# Patient Record
Sex: MALE | Race: WHITE | NOT HISPANIC OR LATINO | Employment: FULL TIME | URBAN - METROPOLITAN AREA
[De-identification: names, ages, dates, MRNs, and addresses within clinical notes are randomized per-mention and may not be internally consistent; named-entity substitution may affect disease eponyms.]

---

## 2017-06-20 ENCOUNTER — OFFICE VISIT (OUTPATIENT)
Dept: URGENT CARE | Facility: PHYSICIAN GROUP | Age: 62
End: 2017-06-20
Payer: COMMERCIAL

## 2017-06-20 VITALS
HEART RATE: 88 BPM | OXYGEN SATURATION: 94 % | DIASTOLIC BLOOD PRESSURE: 64 MMHG | SYSTOLIC BLOOD PRESSURE: 112 MMHG | BODY MASS INDEX: 25.63 KG/M2 | WEIGHT: 189 LBS | RESPIRATION RATE: 16 BRPM | TEMPERATURE: 99.3 F

## 2017-06-20 DIAGNOSIS — J06.9 ACUTE URI: ICD-10-CM

## 2017-06-20 PROCEDURE — 99214 OFFICE O/P EST MOD 30 MIN: CPT | Performed by: PHYSICIAN ASSISTANT

## 2017-06-23 ASSESSMENT — ENCOUNTER SYMPTOMS
SWEATS: 0
FEVER: 0
HEMOPTYSIS: 0
COUGH: 1
MYALGIAS: 0
RHINORRHEA: 1
SORE THROAT: 1
CHILLS: 0
SHORTNESS OF BREATH: 0
HEADACHES: 0
WHEEZING: 0

## 2017-06-23 NOTE — PROGRESS NOTES
Subjective:      Rk Carmichael Jr. is a 61 y.o. male who presents with Cough    PMH:  has a past medical history of HTN (hypertension); GERD (gastroesophageal reflux disease); Gilbert's disease; Diverticulosis of colon; Paroxysmal atrial fibrillation (CMS-HCC) (9/04); Kidney stone (3/2010); Hepatitis B; Hepatitis C; and Diverticulosis (2014).  MEDS:   Current outpatient prescriptions:   •  atorvastatin (LIPITOR) 10 MG TABS, Take 1 Tab by mouth every day., Disp: 90 Tab, Rfl: 1  •  atenolol (TENORMIN) 100 MG TABS, Take 1 Tab by mouth every day., Disp: 90 Tab, Rfl: 1  •  omeprazole (PRILOSEC) 40 MG capsule, Take 1 Cap by mouth every day., Disp: 90 Cap, Rfl: 3  •  Multiple Vitamins-Minerals (MULTIVITAMIN PO), Take  by mouth.  , Disp: , Rfl:   •  aspirin (ASA) 81 MG CHEW, Take 81 mg by mouth every day., Disp: , Rfl:   •  Cyanocobalamin (VITAMIN B12) 100 MCG TABS, Take  by mouth every day., Disp: , Rfl:   •  ibuprofen (MOTRIN) 200 MG TABS, Take 200 mg by mouth every 6 hours as needed., Disp: , Rfl:   ALLERGIES:   Allergies   Allergen Reactions   • Lisinopril      Cough       SURGHX:   Past Surgical History   Procedure Laterality Date   • Tonsillectomy  as child   • Cystoscopy  1961     bladder dilation   • Shoulder arthroscopy w/ rotator cuff repair  1/5/10     excision bone spur, biceps tendon repair   • Knee arthroscopy  5/4/2011     Performed by XOCHITL MEDINA at [facility] ORS   • Medial meniscectomy  5/4/2011     Performed by XOCHITL MEDINA at [facility] ORS   • Meniscectomy  5/4/2011     Performed by XOCHITL MEDINA at [facility] ORS   • Synovectomy  5/4/2011     Performed by XOCHITL MEDINA at [facility] ORS   • Colonoscopy  01/23/14     SOCHX:  reports that he quit smoking about 18 years ago. His smoking use included Cigarettes. He has a 20 pack-year smoking history. He has never used smokeless tobacco. He reports that he drinks alcohol. He reports that he does not use illicit  drugs.  FH: family history includes Cancer in his father, maternal uncle, mother, paternal grandfather, and paternal uncle; Diabetes in his father; Genetic in his brother and mother; Heart Disease in his father; Hypertension in his father and mother.          HPI Comments: Patient presents with:  Cough: congestion, sore throat x 3 days, clear rhinorrhea, no fever.  Has not tried any otc medications or treatment for symptoms.          Cough  This is a new problem. The current episode started in the past 7 days. The problem has been unchanged. The problem occurs every few minutes. The cough is non-productive. Associated symptoms include ear congestion, nasal congestion, postnasal drip, rhinorrhea and a sore throat. Pertinent negatives include no chest pain, chills, ear pain, fever, headaches, hemoptysis, myalgias, rash, shortness of breath, sweats or wheezing. The symptoms are aggravated by exercise, dust, lying down and pollens. Risk factors for lung disease include smoking/tobacco exposure. He has tried nothing for the symptoms. The treatment provided no relief. His past medical history is significant for environmental allergies. There is no history of asthma.       Review of Systems   Constitutional: Negative for fever, chills and malaise/fatigue.   HENT: Positive for congestion, postnasal drip, rhinorrhea and sore throat. Negative for ear pain.    Respiratory: Positive for cough. Negative for hemoptysis, shortness of breath and wheezing.    Cardiovascular: Negative for chest pain.   Musculoskeletal: Negative for myalgias.   Skin: Negative for rash.   Neurological: Negative for headaches.   Endo/Heme/Allergies: Positive for environmental allergies.   All other systems reviewed and are negative.         Objective:     /64 mmHg  Pulse 88  Temp(Src) 37.4 °C (99.3 °F)  Resp 16  Wt 85.73 kg (189 lb)  SpO2 94%     Physical Exam   Constitutional: He appears well-developed and well-nourished. No distress.    HENT:   Head: Normocephalic.   Right Ear: Tympanic membrane and external ear normal.   Left Ear: Tympanic membrane and external ear normal.   Nose: Mucosal edema and rhinorrhea present.   Mouth/Throat: Uvula is midline. Posterior oropharyngeal erythema present. No posterior oropharyngeal edema.   Eyes: Pupils are equal, round, and reactive to light.   Neck: Normal range of motion. Neck supple.   Cardiovascular: Normal rate, regular rhythm and normal heart sounds.    Pulmonary/Chest: Effort normal. He has no decreased breath sounds. He has rhonchi.   Abdominal: Soft.   Musculoskeletal: Normal range of motion.   Lymphadenopathy:     He has no cervical adenopathy.   Neurological: He is alert.   Skin: Skin is warm and dry.   Psychiatric: He has a normal mood and affect.   Nursing note and vitals reviewed.       Assessment/Plan:     1. Acute URI       Discussed that I felt this was viral in nature. Did not see any evidence of a bacterial process. Discussed natural progression and sx care.    PT was instructed to begin a daily OTC allergy medication for relief of any possible allergy symptoms.  Ex: allegra, claritin, zyrtec, allerclear, etc.   Pt can also take OTC benadryl at bedtime if symptoms are keeping them awake at night.       PT should follow up with PCP in 1-2 days for re-evaluation if symptoms have not improved.  Discussed red flags and reasons to return to UC or ED.  Pt and/or family verbalized understanding of diagnosis and follow up instructions and was given informational handout on diagnosis.  PT discharged.

## 2018-11-23 ENCOUNTER — OFFICE VISIT (OUTPATIENT)
Dept: URGENT CARE | Facility: PHYSICIAN GROUP | Age: 63
End: 2018-11-23
Payer: COMMERCIAL

## 2018-11-23 VITALS
HEART RATE: 64 BPM | OXYGEN SATURATION: 95 % | RESPIRATION RATE: 14 BRPM | SYSTOLIC BLOOD PRESSURE: 142 MMHG | WEIGHT: 195 LBS | TEMPERATURE: 97.9 F | BODY MASS INDEX: 27.3 KG/M2 | DIASTOLIC BLOOD PRESSURE: 80 MMHG | HEIGHT: 71 IN

## 2018-11-23 DIAGNOSIS — Z87.19 HISTORY OF DIVERTICULITIS: ICD-10-CM

## 2018-11-23 DIAGNOSIS — R10.32 LLQ ABDOMINAL PAIN: Primary | ICD-10-CM

## 2018-11-23 PROCEDURE — 99214 OFFICE O/P EST MOD 30 MIN: CPT | Performed by: PHYSICIAN ASSISTANT

## 2018-11-23 RX ORDER — CIPROFLOXACIN 500 MG/1
500 TABLET, FILM COATED ORAL 2 TIMES DAILY
Qty: 20 TAB | Refills: 0 | Status: SHIPPED | OUTPATIENT
Start: 2018-11-23 | End: 2018-12-03

## 2018-11-23 RX ORDER — METRONIDAZOLE 500 MG/1
500 TABLET ORAL 3 TIMES DAILY
Qty: 30 TAB | Refills: 0 | Status: SHIPPED | OUTPATIENT
Start: 2018-11-23 | End: 2018-12-03

## 2018-11-24 NOTE — PROGRESS NOTES
Subjective:      Rk Carmichael Jr. is a 63 y.o. male who presents with Diverticulosis (poss diverticulosis flare up)    PMH:  has a past medical history of Diverticulosis (2014); Diverticulosis of colon; GERD (gastroesophageal reflux disease); Gilbert's disease; Hepatitis B; Hepatitis C; HTN (hypertension); Kidney stone (3/2010); and Paroxysmal atrial fibrillation (HCC) (9/04).  MEDS:   Current Outpatient Prescriptions:   •  ibuprofen (MOTRIN) 200 MG TABS, Take 200 mg by mouth every 6 hours as needed., Disp: , Rfl:   •  atorvastatin (LIPITOR) 10 MG TABS, Take 1 Tab by mouth every day., Disp: 90 Tab, Rfl: 1  •  atenolol (TENORMIN) 100 MG TABS, Take 1 Tab by mouth every day., Disp: 90 Tab, Rfl: 1  •  omeprazole (PRILOSEC) 40 MG capsule, Take 1 Cap by mouth every day., Disp: 90 Cap, Rfl: 3  •  Multiple Vitamins-Minerals (MULTIVITAMIN PO), Take  by mouth.  , Disp: , Rfl:   •  aspirin (ASA) 81 MG CHEW, Take 81 mg by mouth every day., Disp: , Rfl:   •  Cyanocobalamin (VITAMIN B12) 100 MCG TABS, Take  by mouth every day., Disp: , Rfl:   ALLERGIES:   Allergies   Allergen Reactions   • Lisinopril      Cough       SURGHX:   Past Surgical History:   Procedure Laterality Date   • COLONOSCOPY  01/23/14   • KNEE ARTHROSCOPY  5/4/2011    Performed by XOCHITL MEDINA at [facility] ORS   • MEDIAL MENISCECTOMY  5/4/2011    Performed by XOCHITL MEDINA at [facility] ORS   • MENISCECTOMY  5/4/2011    Performed by XOCHITL MEDINA at [facility] ORS   • SYNOVECTOMY  5/4/2011    Performed by XOCHITL MEDINA at [facility] ORS   • SHOULDER ARTHROSCOPY W/ ROTATOR CUFF REPAIR  1/5/10    excision bone spur, biceps tendon repair   • CYSTOSCOPY  1961    bladder dilation   • TONSILLECTOMY  as child     SOCHX:  reports that he quit smoking about 19 years ago. His smoking use included Cigarettes. He has a 20.00 pack-year smoking history. He has never used smokeless tobacco. He reports that he drinks alcohol. He  "reports that he does not use drugs.  FH: Reviewed with patient/family. Not pertinent to this complaint.           Patient presents with:  Diverticulosis: poss diverticulosis flare up. Pt has history of diverticulitis, states this feels the same as last time.  Pt has had increasing discomfort over the course of 10-12 days.  Started as a twinge here and there, progressing to a more consistent pain.  Pt denies fever, n/v/d/c states some relief with BM but no resolution.            LLQ Pain    This is a new problem. The current episode started 1 to 4 weeks ago. The onset quality is gradual. The problem occurs constantly. The problem has been waxing and waning. The pain is located in the LLQ and suprapubic region. The pain is at a severity of 5/10. The quality of the pain is colicky, aching and cramping. The abdominal pain does not radiate. Pertinent negatives include no anorexia, constipation, diarrhea, fever, hematochezia, melena, nausea or vomiting. The pain is aggravated by certain positions, eating and palpation. The pain is relieved by certain positions and bowel movements. Treatments tried: change in diet, increase in fiber with fiber pills, probiotics.  The treatment provided no relief. Prior diagnostic workup includes CT scan. There is no history of colon cancer, Crohn's disease, irritable bowel syndrome or ulcerative colitis.  diverticulitis       Review of Systems   Constitutional: Negative for fever.   Gastrointestinal: Positive for abdominal pain. Negative for anorexia, blood in stool (bleeding hemorroid - on/off), constipation, diarrhea, heartburn, hematochezia, melena, nausea and vomiting.   All other systems reviewed and are negative.         Objective:     /80 (BP Location: Left arm, Patient Position: Sitting, BP Cuff Size: Small adult)   Pulse 64   Temp 36.6 °C (97.9 °F) (Temporal)   Resp 14   Ht 1.803 m (5' 11\")   Wt 88.5 kg (195 lb)   SpO2 95%   BMI 27.20 kg/m²       Physical Exam "   Constitutional: He is oriented to person, place, and time. He appears well-developed and well-nourished. No distress.   HENT:   Head: Normocephalic and atraumatic.   Nose: Nose normal.   Mouth/Throat: Oropharynx is clear and moist.   Eyes: Pupils are equal, round, and reactive to light. Conjunctivae and EOM are normal.   Neck: Normal range of motion.   Cardiovascular: Regular rhythm and normal heart sounds.    Pulmonary/Chest: Effort normal and breath sounds normal.   Abdominal: Soft. Bowel sounds are normal. There is tenderness in the left lower quadrant. There is no rigidity, no rebound, no guarding and no CVA tenderness.       Musculoskeletal: Normal range of motion.   Neurological: He is alert and oriented to person, place, and time. Gait normal.   Skin: Skin is warm and dry. Capillary refill takes less than 2 seconds.   Psychiatric: He has a normal mood and affect.   Nursing note and vitals reviewed.              Assessment/Plan:     1. LLQ abdominal pain  ciprofloxacin (CIPRO) 500 MG Tab    metroNIDAZOLE (FLAGYL) 500 MG Tab   2. History of diverticulitis  ciprofloxacin (CIPRO) 500 MG Tab    metroNIDAZOLE (FLAGYL) 500 MG Tab     PT does not feel he needs imaging at this time, would like to attempt treating with oral antibiotics first.  Patient will go to a liquid diet for a few days to give himself some bowel rest.  Patient's wife is a nurse, they both feel confident that they will recognize the signs of any worsening condition.    PT should follow up with PCP in 1-2 days for re-evaluation if symptoms have not improved.  Discussed red flags and reasons to return to UC or ED.  Pt and/or family verbalized understanding of diagnosis and follow up instructions and was offered informational handout on diagnosis.  PT discharged.

## 2018-11-25 ASSESSMENT — ENCOUNTER SYMPTOMS
HEMATOCHEZIA: 0
ANOREXIA: 0
VOMITING: 0
ABDOMINAL PAIN: 1
NAUSEA: 0
FEVER: 0
BLOOD IN STOOL: 0
DIARRHEA: 0
CONSTIPATION: 0
HEARTBURN: 0

## 2018-11-25 ASSESSMENT — CROHNS DISEASE ACTIVITY INDEX (CDAI): CDAI SCORE: 0

## 2019-02-25 ENCOUNTER — HOSPITAL ENCOUNTER (OUTPATIENT)
Dept: RADIOLOGY | Facility: MEDICAL CENTER | Age: 64
End: 2019-02-25
Attending: PHYSICIAN ASSISTANT
Payer: COMMERCIAL

## 2019-02-25 DIAGNOSIS — R10.32 LLQ PAIN: ICD-10-CM

## 2019-02-25 PROCEDURE — 74177 CT ABD & PELVIS W/CONTRAST: CPT

## 2019-02-25 PROCEDURE — 700117 HCHG RX CONTRAST REV CODE 255: Performed by: PHYSICIAN ASSISTANT

## 2019-02-25 RX ADMIN — IOHEXOL 50 ML: 240 INJECTION, SOLUTION INTRATHECAL; INTRAVASCULAR; INTRAVENOUS; ORAL at 14:30

## 2019-02-25 RX ADMIN — IOHEXOL 100 ML: 350 INJECTION, SOLUTION INTRAVENOUS at 14:30

## 2019-08-15 ENCOUNTER — OFFICE VISIT (OUTPATIENT)
Dept: URGENT CARE | Facility: PHYSICIAN GROUP | Age: 64
End: 2019-08-15
Payer: COMMERCIAL

## 2019-08-15 VITALS
OXYGEN SATURATION: 98 % | WEIGHT: 201 LBS | BODY MASS INDEX: 28.14 KG/M2 | HEART RATE: 56 BPM | SYSTOLIC BLOOD PRESSURE: 118 MMHG | TEMPERATURE: 97.9 F | HEIGHT: 71 IN | RESPIRATION RATE: 12 BRPM | DIASTOLIC BLOOD PRESSURE: 76 MMHG

## 2019-08-15 DIAGNOSIS — M79.672 ACUTE PAIN OF LEFT FOOT: ICD-10-CM

## 2019-08-15 PROCEDURE — 99213 OFFICE O/P EST LOW 20 MIN: CPT | Performed by: FAMILY MEDICINE

## 2019-08-15 RX ORDER — LOSARTAN POTASSIUM 100 MG/1
100 TABLET ORAL DAILY
COMMUNITY
Start: 2019-07-28

## 2019-08-15 ASSESSMENT — ENCOUNTER SYMPTOMS
NUMBNESS: 1
FEVER: 0
JOINT SWELLING: 0
WEAKNESS: 0

## 2019-08-15 NOTE — PROGRESS NOTES
"Subjective:   Rk Carmichael Jr. is a 64 y.o. male who presents for Foot Pain (red, swollen, painful (L) x5days)     Foot Problem   This is a new problem. The current episode started in the past 7 days. The problem occurs constantly. The problem has been gradually worsening. Associated symptoms include numbness. Pertinent negatives include no fever, joint swelling or weakness.     Review of Systems   Constitutional: Negative for fever.   Musculoskeletal: Negative for joint swelling.   Neurological: Positive for numbness. Negative for weakness.      Objective:   /76 (BP Location: Right arm, Patient Position: Sitting, BP Cuff Size: Adult)   Pulse (!) 56   Temp 36.6 °C (97.9 °F) (Temporal)   Resp 12   Ht 1.803 m (5' 11\")   Wt 91.2 kg (201 lb)   SpO2 98%   BMI 28.03 kg/m²   Physical Exam   Constitutional: He is oriented to person, place, and time. He appears well-developed and well-nourished. No distress.   HENT:   Head: Normocephalic and atraumatic.   Eyes: Pupils are equal, round, and reactive to light. Conjunctivae and EOM are normal.   Cardiovascular: Normal rate and regular rhythm.   No murmur heard.  Pulmonary/Chest: Effort normal and breath sounds normal. No respiratory distress.   Abdominal: Soft. He exhibits no distension. There is no tenderness.   Musculoskeletal:        Left foot: There is tenderness and swelling. There is normal range of motion, no bony tenderness and no deformity.   Neurological: He is alert and oriented to person, place, and time. He has normal reflexes. No sensory deficit.   Mild decrease in sensation to dorsal left foot.   Skin: Skin is warm and dry.   Psychiatric: He has a normal mood and affect.        Assessment/Plan:   1. Acute pain of left foot    Other orders  - losartan (COZAAR) 100 MG Tab; Take 100 mg by mouth every day.    Differential diagnosis, natural history, supportive care, and indications for immediate follow-up discussed.    Use over-the-counter pain " reliever, such as acetaminophen (Tylenol), ibuprofen (Advil, Motrin) or naproxen (Aleve) as needed; follow package directions for dosing.

## 2020-03-18 ENCOUNTER — TELEPHONE (OUTPATIENT)
Dept: NEUROLOGY | Facility: MEDICAL CENTER | Age: 65
End: 2020-03-18

## 2020-03-18 NOTE — TELEPHONE ENCOUNTER
1. Caller Name: Rk                      Call Back Number:   [facility] PCP or Specialty Provider: Yes Patricia Ward        2.  Does patient have any active symptoms of respiratory illness (fever OR cough OR shortness of breath)? Yes, the patient reports the following respiratory symptoms: cough and mild sore throat, SOB.    3.  Does patient have any comoribidities? None     4.  In the last 30 days, has the patient traveled outside of the country OR in a high risk area within the [place] OR have any known contact with someone who has or is suspected to have COVID-19?  No.    5. Disposition: Advised to perform self care, monitor for worsening symptoms and to call back in 3 days if no improvement    Note routed to PCP: JJ only.

## 2022-08-04 ENCOUNTER — HOSPITAL ENCOUNTER (OUTPATIENT)
Dept: RADIOLOGY | Facility: MEDICAL CENTER | Age: 67
End: 2022-08-04
Attending: FAMILY MEDICINE
Payer: MEDICARE

## 2022-08-04 DIAGNOSIS — S92.354D CLOSED NONDISPLACED FRACTURE OF FIFTH METATARSAL BONE OF RIGHT FOOT WITH ROUTINE HEALING: ICD-10-CM

## 2022-08-04 PROCEDURE — 77080 DXA BONE DENSITY AXIAL: CPT

## 2024-09-11 ENCOUNTER — HOSPITAL ENCOUNTER (OUTPATIENT)
Dept: RADIOLOGY | Facility: MEDICAL CENTER | Age: 69
End: 2024-09-11
Payer: MEDICARE

## 2024-09-11 DIAGNOSIS — R13.10 DYSPHAGIA, UNSPECIFIED TYPE: ICD-10-CM

## 2024-09-11 DIAGNOSIS — K63.5 POLYP OF COLON, UNSPECIFIED PART OF COLON, UNSPECIFIED TYPE: ICD-10-CM

## 2024-09-11 DIAGNOSIS — Z80.0 FAMILY HISTORY OF COLON CANCER: ICD-10-CM

## 2024-09-11 DIAGNOSIS — R13.13 PHARYNGEAL DYSPHAGIA: ICD-10-CM

## 2024-09-11 PROCEDURE — 74230 X-RAY XM SWLNG FUNCJ C+: CPT

## 2024-09-11 PROCEDURE — 700117 HCHG RX CONTRAST REV CODE 255: Performed by: FAMILY MEDICINE

## 2024-09-11 PROCEDURE — 92611 MOTION FLUOROSCOPY/SWALLOW: CPT

## 2024-09-11 RX ADMIN — BARIUM SULFATE 700 MG: 700 TABLET ORAL at 15:30

## 2024-09-12 NOTE — OP THERAPY EVALUATION
[facility] Therapy Services  Outpatient Modified Barium Swallow Study       Patient Name: Rk Carmichael Jr.  Date of Evaluation: 9/11/2024  Referring Provider: Jeremias De La Cruz P.A.-C.  Fax: [phone]    Patient History/Reason for Referral  The pt was referred for a Modified Barium Swallow Study (MBSS) by GI due to dysphagia with choking fits.    PMHx: GERD on PPI, Afib, HTN, Hepatitis C, Gilbert's disease, diverticulosis of the colon, dyslipidemia.    Oral Mechanism Evaluation  Facial Symmetry: Equal  Facial Sensation: Equal  Labial Observations: WFL  Lingual Observations: Midline  Dentition: Good  Comments:    Voice  Quality: WFL  Resonance: WFL  Intensity: Appropriate  Cough: WFL  Comments:    Current Method of Nutrition   Oral diet (Regular solids, Thin liquids)    Pertinent Information  Affect/Behavior: Calm, Cooperative  Oxygen Requirements: Room Air  Secretion Management: WNL    Subjective  Patient reports feeling saliva and/or liquid going down the wrong way in his throat ~2-3x per week. He reports the frequency as increased over the last 6 months. He also reports pills getting stuck in his chest area. He reports a hx of esophageal dilation ~12-15 years ago.    Discussed with the risks, benefits, and alternatives of the MBSS procedure. Patient acknowledged and agreed to proceed.    Assessment  Videofluoroscopic Swallow Study was conducted in the lateral and AP projection(s) to evaluate oropharyngeal swallow function. A radiology tech was present to assist with the procedure.     Positioning: seated upright in fluoroscopy chair, standing (AP view)  Anatomic View: WNL  Bolus Administration: SLP and Patient  PO barium contrast trials: Varibar thin liquid, Varibar nectar (mildly thick) liquid, Varibar pudding, solid coated in Varibar pudding, barium tablet w/ water    Consistency PAS Score Timing Residue Comments   Thin Liquid 3 During swallow Vallecular Residue: Mild (5%-25%)  Pyriform Sinus Residue:  Mild (5%-25%) Tsp, cup, straw - PAS 1  Tsp - PAS 2 during  Serial cup - PAS 3 during   Mildly Thick Liquid 1 N/A Vallecular Residue: Mild (5%-25%)  Pyriform Sinus Residue: Trace (1%-5%) Tsp, cup   Pudding 1 N/A Vallecular Residue: Trace (1%-5%)  Pyriform Sinus Residue: Trace (1%-5%)    Soft & Bite Sized 1 N/A Vallecular Residue: Moderate (25%-50%)  Pyriform Sinus Residue: Trace (1%-5%)    Regular 1 N/A Vallecular Residue: Mild (5%-25%)  Pyriform Sinus Residue: None (0%)    Barium Tablet N/A N/A Vallecular Residue: None (0%)  Pyriform Sinus Residue: None (0%)        Penetration-Aspiration Scale (PAS)  1     No contrast enters airway  2     Contrast enters the airway, remains above the vocal folds, and is ejected from the airway (not seen in the airway at the end of the swallow).  3     Contrast enters the airway, remains above the vocal folds, and is not ejected from the airway (is seen in the airway after the swallow).  4     Contrast enters the airway, contacts the vocal folds, and is ejected from the airway.  5     Contrast enters the airway, contacts the vocal folds, and is not ejected from the airway  6     Contrast enters the airway, crosses the plane of the vocal folds, and is ejected from the airway.  7     Contrast enters the airway, crosses the plane of the vocal folds, and is not ejected from the airway despite effort.  8     Contrast enters the airway, crosses the plane of the vocal folds, is not ejected from the airway and there is no response to aspiration.    The following qualitative information was analyzed via the MBSImp Protocol (Crystal et al., 2008):     Oral phase:  Lip closure was characterized by complete labial seal.   Tongue control during bolus hold was characterized by cohesive bolus between tongue to palatal seal  Bolus preparation/mastication was partial/reduced with soft solids though timely and efficient with dry solids.   Bolus transport/lingual motion was brisk.   Oral  clearance was incomplete with residue collection on oral structures due to piecemeal deglutition (variation of normal).    Pharyngeal phase:  Initiation of pharyngeal swallow was with the bolus head at posterior laryngeal surface of epiglottis at first hyoid excursion.  Soft palate elevation was complete with no bolus between soft palate (SP)/pharyngeal wall (PW).  Laryngeal elevation was partial for superior movement of thyroid cartilage/partial approximation of arytenoids to epiglottic petiole.  Anterior hyoid excursion was partial for anterior movement.  Epiglottic inversion was partial.  Laryngeal vestibule closure was incomplete with narrow column contrast in laryngeal vestibule with thin liquids via tsp and serial cup sips. There was spontaneous ejection of contrast with tsp but not with the serial cup sips.   Pharyngeal stripping wave was present and complete.  Pharyngeal contraction was complete bilaterally.  Pharyngoesophageal Segment Opening was complete with complete distension and complete duration with no obstruction of flow.  Tongue base retraction was moderately decreased with narrow column of contrast or air between TB and PW.  There was a pattern of trace-mild vallecular and pyriform sinus residue with more moderate vallecular residue with soft solids, to which pt was sensate and able to clear with a dry swallow.     Esophageal phase:  Assessed with thin liquids via straw, pudding, barium tablet w/ water.   Esophageal clearance was complete with thin liquid and pudding boluses. There was brief retention of the barium tablet at the GE junction with ultimately complete clearance.     Compensatory Strategies:  Cleansing swallow: EFFECTIVE to clear pharyngeal residue  Liquid rinse: EFFECTIVE to clear pharyngeal residue w/ dry solid    Clinical Impressions  The pt presents with a mild pharyngeal dysphagia, likely chronic in nature. Dysphagia is characterized by reduced tongue base retraction, incomplete  epiglottic inversion and reduced laryngeal vestibule closure, resulting in intermittent penetration of thin liquids and a pattern of trace-mild vallecular and pyriform sinus residue with increased vallecular residue with partially masticated solids. Diet modification is not indicated though pt will benefit from compensatory strategies as listed below. Though there were no significant esophageal findings today, pt should follow up with GI provider to address reported esophageal dysphagia symptoms, especially given his history of GERD and esophageal dilation.     Recommendations  Diet: Regular solids, Thin liquids  2. Swallowing Instructions & Precautions:   Supervision: Independent  Positioning: Fully upright and midline during oral intake  Medication: Whole with liquid, One pill at a time, As tolerated  Strategies: Small bites/sips, Alternate bites and sips, Slow rate of intake, Multiple swallows (x 2) per bite/sip   Oral Care: BID  3.  Return to GI for follow up to address reported esophageal dysphagia symptoms with pills.    Thank you for the referral. For further questions, please call [phone].  Usha Jaramillo, MS,CCC-SLP

## 2024-11-22 ENCOUNTER — OFFICE VISIT (OUTPATIENT)
Dept: URGENT CARE | Facility: PHYSICIAN GROUP | Age: 69
End: 2024-11-22
Payer: MEDICARE

## 2024-11-22 VITALS
DIASTOLIC BLOOD PRESSURE: 62 MMHG | HEART RATE: 56 BPM | BODY MASS INDEX: 28.04 KG/M2 | SYSTOLIC BLOOD PRESSURE: 110 MMHG | WEIGHT: 200.3 LBS | OXYGEN SATURATION: 98 % | TEMPERATURE: 98 F | RESPIRATION RATE: 18 BRPM | HEIGHT: 71 IN

## 2024-11-22 DIAGNOSIS — H65.191 OTHER NON-RECURRENT ACUTE NONSUPPURATIVE OTITIS MEDIA OF RIGHT EAR: ICD-10-CM

## 2024-11-22 PROCEDURE — 99213 OFFICE O/P EST LOW 20 MIN: CPT | Performed by: STUDENT IN AN ORGANIZED HEALTH CARE EDUCATION/TRAINING PROGRAM

## 2024-11-22 PROCEDURE — 3078F DIAST BP <80 MM HG: CPT | Performed by: STUDENT IN AN ORGANIZED HEALTH CARE EDUCATION/TRAINING PROGRAM

## 2024-11-22 PROCEDURE — 3074F SYST BP LT 130 MM HG: CPT | Performed by: STUDENT IN AN ORGANIZED HEALTH CARE EDUCATION/TRAINING PROGRAM

## 2024-11-22 RX ORDER — AMOXICILLIN 875 MG/1
875 TABLET, COATED ORAL 2 TIMES DAILY
Qty: 10 TABLET | Refills: 0 | Status: SHIPPED | OUTPATIENT
Start: 2024-11-22 | End: 2024-11-27

## 2024-11-22 ASSESSMENT — ENCOUNTER SYMPTOMS
RESPIRATORY NEGATIVE: 1
CARDIOVASCULAR NEGATIVE: 1
CONSTITUTIONAL NEGATIVE: 1

## 2024-11-23 NOTE — PROGRESS NOTES
Subjective:   Rk Carmichael Jr. is a 69 y.o. male who presents for Other (R Ear ache, pain, pooping, inflammation x 3 days)      HPI:  69-year-old male who presents with right sided ear pain, ache, muffled.  And irritation for the past 2 days.  He does wear hearing aids but reports this is new discomfort.  He denies any fevers or chills.  He did notice some wax in his ear that was coming out and appeared more wax than usual.  Denies any significant congestion or any other viral symptoms but reports he does suffer from seasonal allergies and takes a daily and histamine and Flonase.    Review of Systems   Constitutional: Negative.    HENT:  Positive for ear pain, hearing loss and tinnitus.    Respiratory: Negative.     Cardiovascular: Negative.        Medications:    aspirin Chew  atenolol Tabs  atorvastatin Tabs  ibuprofen Tabs  losartan Tabs  MULTIVITAMIN PO  omeprazole  Vitamin B12 Tabs    Allergies: Lisinopril    Problem List: Rk Carmichael Jr. does not have any pertinent problems on file.    Surgical History:  Past Surgical History:   Procedure Laterality Date    COLONOSCOPY  01/23/14    KNEE ARTHROSCOPY  5/4/2011    Performed by XOCHITL MEDINA at [facility]    MENISCECTOMY, KNEE, MEDIAL  5/4/2011    Performed by XOCHITL MEDINA at [facility]    MENISCECTOMY  5/4/2011    Performed by XOCHITL MEDINA at [facility]    SYNOVECTOMY  5/4/2011    Performed by XOCHITL MEDINA at [facility]    SHOULDER ARTHROSCOPY W/ ROTATOR CUFF REPAIR  1/5/10    excision bone spur, biceps tendon repair    CYSTOSCOPY  1961    bladder dilation    TONSILLECTOMY  as child       Past Social Hx: Rk Carmichael Jr.  reports that he has been smoking cigars. He started smoking about 45 years ago. He has a 20 pack-year smoking history. He has never used smokeless tobacco. He reports current alcohol use. He reports that he does not use drugs.     Past Family Hx:  Rk Carmichael Jr.  "family history includes Cancer in his father, maternal uncle, mother, paternal grandfather, and paternal uncle; Diabetes in his father; Genetic Disorder in his brother and mother; Heart Disease in his father; Hypertension in his father and mother.     Problem list, medications, and allergies reviewed by myself today in Epic.     Objective:     /62   Pulse (!) 56   Temp 36.7 °C (98 °F)   Resp 18   Ht 1.803 m (5' 11\")   Wt 90.9 kg (200 lb 4.8 oz)   SpO2 98%   BMI 27.94 kg/m²     Physical Exam  Constitutional:       Appearance: Normal appearance.   HENT:      Right Ear: A middle ear effusion is present. Tympanic membrane is injected and bulging.      Left Ear: Tympanic membrane normal.  No middle ear effusion. Tympanic membrane is not injected or bulging.      Ears:      Comments: Small amount of bubbles/Air-fluid level noted behind right membrane     Nose: Nose normal. No congestion.      Mouth/Throat:      Mouth: Mucous membranes are dry.      Pharynx: Oropharynx is clear. No oropharyngeal exudate.   Eyes:      Extraocular Movements: Extraocular movements intact.      Conjunctiva/sclera: Conjunctivae normal.   Cardiovascular:      Rate and Rhythm: Normal rate and regular rhythm.   Pulmonary:      Breath sounds: Normal breath sounds.   Neurological:      Mental Status: He is alert.         Assessment/Plan:     Diagnosis and associated orders:     1. Other non-recurrent acute nonsuppurative otitis media of right ear  carbamide peroxide (DEBROX) 6.5 % Solution    amoxicillin (AMOXIL) 875 MG tablet         Comments/MDM:     1. Other non-recurrent acute nonsuppurative otitis media of right ear  Primary Lety membrane small amounts of hard wax present.  Not fully impacted or occluding TM.  - Will treat with Debrox 6 drops into right ear twice daily for 5 days.  Offered to flush ear out today but patient declined.  - Air-fluid level seen behind right tympanic membrane slightly erythematous and bulging will " treat as AOM as below.  - Patient instructed on probiotic and fiber use while taking amoxicillin.  - Return precautions discussed with patient.  - carbamide peroxide (DEBROX) 6.5 % Solution; Administer 6 Drops into the right ear 2 times a day.  Dispense: 15 mL; Refill: 0  - amoxicillin (AMOXIL) 875 MG tablet; Take 1 Tablet by mouth 2 times a day for 5 days.  Dispense: 10 Tablet; Refill: 0           Differential diagnosis, natural history, supportive care, and indications for immediate follow-up discussed.    Advised the patient to follow-up with the primary care physician for recheck, reevaluation, and consideration of further management.    Please note that this dictation was created using voice recognition software. I have made a reasonable attempt to correct obvious errors, but I expect that there are errors of grammar and possibly content that I did not discover before finalizing the note.    Be Issa M.D.